# Patient Record
Sex: FEMALE | URBAN - METROPOLITAN AREA
[De-identification: names, ages, dates, MRNs, and addresses within clinical notes are randomized per-mention and may not be internally consistent; named-entity substitution may affect disease eponyms.]

---

## 2021-01-13 ENCOUNTER — APPOINTMENT (RX ONLY)
Dept: URBAN - METROPOLITAN AREA CLINIC 33 | Facility: CLINIC | Age: 31
Setting detail: DERMATOLOGY
End: 2021-01-13

## 2021-01-13 DIAGNOSIS — D22 MELANOCYTIC NEVI: ICD-10-CM

## 2021-01-13 DIAGNOSIS — L70.0 ACNE VULGARIS: ICD-10-CM

## 2021-01-13 PROBLEM — D22.9 MELANOCYTIC NEVI, UNSPECIFIED: Status: ACTIVE | Noted: 2021-01-13

## 2021-01-13 PROCEDURE — ? TREATMENT REGIMEN

## 2021-01-13 PROCEDURE — 99203 OFFICE O/P NEW LOW 30 MIN: CPT

## 2021-01-13 ASSESSMENT — LOCATION ZONE DERM: LOCATION ZONE: FACE

## 2021-01-13 ASSESSMENT — LOCATION SIMPLE DESCRIPTION DERM
LOCATION SIMPLE: LEFT FOREHEAD
LOCATION SIMPLE: LEFT CHEEK
LOCATION SIMPLE: RIGHT CHEEK

## 2021-01-13 ASSESSMENT — LOCATION DETAILED DESCRIPTION DERM
LOCATION DETAILED: LEFT INFERIOR CENTRAL MALAR CHEEK
LOCATION DETAILED: RIGHT INFERIOR CENTRAL MALAR CHEEK
LOCATION DETAILED: LEFT SUPERIOR MEDIAL FOREHEAD

## 2021-01-13 NOTE — HPI: PIMPLES (ACNE)
How Severe Is Your Acne?: mild
Is This A New Presentation, Or A Follow-Up?: Acne
Additional Comments (Use Complete Sentences): Patient has been breaking out since the start of COVID and she has been using charcoal cleanser.

## 2021-01-13 NOTE — HPI: DISCOLORATION (HYPERPIGMENTATION)
Is This A New Presentation, Or A Follow-Up?: Discoloration
How Severe Is It?: mild
Additional History: Patient notes some dark spots forming and she would like these looked at.

## 2021-01-13 NOTE — HPI: BODY LOCATION - FACE
How Severe Is Your Condition?: mild
Additional History: Patient has been getting wrinkles and wants to know if she can do anything about it.

## 2021-01-13 NOTE — PROCEDURE: MIPS QUALITY
Detail Level: Simple
Quality 431: Preventive Care And Screening: Unhealthy Alcohol Use - Screening: Unhealthy alcohol use screening not performed, reason not otherwise specified
Quality 110: Preventive Care And Screening: Influenza Immunization: Influenza immunization was not ordered or administered, reason not given
Quality 226: Preventive Care And Screening: Tobacco Use: Screening And Cessation Intervention: Patient screened for tobacco use and is an ex/non-smoker
Quality 130: Documentation Of Current Medications In The Medical Record: Current Medications Documented

## 2021-01-13 NOTE — PROCEDURE: TREATMENT REGIMEN
Detail Level: Zone
Plan: Mild acne \\nContinue with charcoal beauty 360\\nStart Neutrogena Rapid clear at bed time on T Zone \\nAlso uses Nakia , picture of her products on chart \\n
Plan: Discussed about having annual check ups , today all look normal

## 2022-04-14 ENCOUNTER — APPOINTMENT (RX ONLY)
Dept: URBAN - METROPOLITAN AREA CLINIC 1 | Facility: CLINIC | Age: 32
Setting detail: DERMATOLOGY
End: 2022-04-14

## 2022-04-14 DIAGNOSIS — L81.4 OTHER MELANIN HYPERPIGMENTATION: ICD-10-CM

## 2022-04-14 DIAGNOSIS — L90.8 OTHER ATROPHIC DISORDERS OF SKIN: ICD-10-CM

## 2022-04-14 PROCEDURE — ? COUNSELING

## 2022-04-14 PROCEDURE — ? SUNSCREEN RECOMMENDATIONS

## 2022-04-14 PROCEDURE — 99212 OFFICE O/P EST SF 10 MIN: CPT

## 2022-04-14 PROCEDURE — ? SKIN CARE REGIMEN

## 2022-04-14 PROCEDURE — ? PRESCRIPTION MEDICATION MANAGEMENT

## 2022-04-14 ASSESSMENT — LOCATION DETAILED DESCRIPTION DERM: LOCATION DETAILED: MID TRAPEZIAL NECK

## 2022-04-14 ASSESSMENT — LOCATION SIMPLE DESCRIPTION DERM: LOCATION SIMPLE: TRAPEZIAL NECK

## 2022-04-14 ASSESSMENT — LOCATION ZONE DERM: LOCATION ZONE: NECK

## 2022-04-14 NOTE — PROCEDURE: PRESCRIPTION MEDICATION MANAGEMENT
Detail Level: Zone
Plan: Advised regular sunscreen use.\\nPt will consider retinal to fade over time.
Render In Strict Bullet Format?: No

## 2022-04-14 NOTE — PROCEDURE: SUNSCREEN RECOMMENDATIONS
General Sunscreen Counseling: Broad spectrum sunscreen protects the skin from UVA and UVB rays. UVA rays are responsible for DNA damage and accelerate skin aging (fine lines, wrinkles, age spots). UVB rays are responsible for sunburns and DNA damage. Overexposure to both can lead to skin cancer. Even darker skin tones can develop skin cancer.\\n\\Herrera addition to protecting your skin against skin cancer, sunscreen protects your skin from light that induces hyperpigmentation. When trying to fade hyperpigmentation from acne, melasma, or general skin aging, sunscreen is an integral part of your skincare routine.\\n\\nChemical sunscreens absorb into the skin and convert UV Rays. They typically are lightweight and do not leave a white cast. They contain ingredients such as avobenzone, octinoxate, oxybenzone, etc. Physical sunscreens create a barrier that reflects UV rays. They are good for sensitive skin, and best for melasma and hyperpigmentation. Because they tend to leave a whitish cast, darker skin tones may prefer a tinted sunscreen that blends into the skin well.\\n\\nA broad spectrum SPF 30+ should be applied daily, even if it's a cloudy or rainy day, even if you’re indoors most of the day, and even if you only go from your house to your car to work or school. If you plan on being outdoors for extended periods of time, plan to reapply your sunscreen to sun exposed areas every two hours.
Products Recommended: La Roche Posay Anthelios Ultra Light Fluid SPF 60 ~$30 \\nCeraVe AM Facial Moisturizing Lotion SPF 30 ~$15\\nCetaphil Oil Absorbing Moisturizer SPF 30 ~$20\\n\\nEltaMD UV Clear SPF 46 ~$36\\nShiseido Ultimate Sun Protector SPF 50 ~$50\\nLa Roche Posay Anthelios AOX Serum SPF 50 ~$43\\n\\nISDIN Eryfotona Ageless SPF 50 ~$66\\nTizo3 Age Defying Fusion SPF 40 ~$40\\nEltaMD UV Elements SPF 44 ~$36\\nREssentials Tinted Mineral Sunscreen SPF 50+ ~$55
Detail Level: Detailed

## 2022-04-14 NOTE — PROCEDURE: SKIN CARE REGIMEN
Recommendation Preamble: We dicussed the basic components of a skincare routine. The following skin care regimen instructions were provided to the patient:
Recommendations (Free Text): Cleanser: tailor your cleanser to your skin type (oily, dry, normal, combination, acne prone, etc.). your cleanser may change based on your skin's needs.\\n\\nAntioxidant: antioxidant serums are an easy way to protect your skin from environmental damage, as well as fight aging and brighten skin. look for serums with ingredients like niacinamide, vitamin c, or resveratrol\\n\\nMoisturizer: your moisturizer should also be tailored to your skin type. a good moisturizer keeps your skin hydrated throughout the day, without feeling greasy. oily and acne prone skin types should look for light weight gel or serum moisturizers, while drier types should look for heavier creams or lotions\\n\\nExfoliant: exfoliation removes dead skin cells and improves the tone and texture of skin. exfoliants can be chemical (retinoids, acids, enzymes) or physical (scrubs, microexfoliants).\\n\\nSunscreen: sunscreen is important, regardless of the color of your skin. sunscreen protects against premature aging, promotes an even skin tone, and helps to heal hyperpigmentation. In addition to its cosmetic benefits, it protects against skin cancer.
Detail Level: Zone

## 2024-08-27 ENCOUNTER — APPOINTMENT (RX ONLY)
Dept: URBAN - METROPOLITAN AREA CLINIC 1 | Facility: CLINIC | Age: 34
Setting detail: DERMATOLOGY
End: 2024-08-27

## 2024-08-27 DIAGNOSIS — D22 MELANOCYTIC NEVI: ICD-10-CM

## 2024-08-27 DIAGNOSIS — L82.1 OTHER SEBORRHEIC KERATOSIS: ICD-10-CM

## 2024-08-27 PROBLEM — D22.39 MELANOCYTIC NEVI OF OTHER PARTS OF FACE: Status: ACTIVE | Noted: 2024-08-27

## 2024-08-27 PROCEDURE — ? COUNSELING

## 2024-08-27 PROCEDURE — 99212 OFFICE O/P EST SF 10 MIN: CPT

## 2024-08-27 PROCEDURE — ? OBSERVATION

## 2024-08-27 ASSESSMENT — LOCATION ZONE DERM
LOCATION ZONE: FACE
LOCATION ZONE: LEG

## 2024-08-27 ASSESSMENT — LOCATION SIMPLE DESCRIPTION DERM
LOCATION SIMPLE: RIGHT CHEEK
LOCATION SIMPLE: LEFT THIGH
LOCATION SIMPLE: LEFT FOREHEAD
LOCATION SIMPLE: RIGHT TEMPLE

## 2024-08-27 ASSESSMENT — LOCATION DETAILED DESCRIPTION DERM
LOCATION DETAILED: RIGHT MID TEMPLE
LOCATION DETAILED: LEFT ANTERIOR PROXIMAL THIGH
LOCATION DETAILED: RIGHT CENTRAL MALAR CHEEK
LOCATION DETAILED: LEFT LATERAL FOREHEAD

## 2024-08-27 NOTE — HPI: MOLE CHECK
What Is The Reason For Today's Visit?: Mole Check
Additional History: Pt wants to know if she should be concerned about her moles and if she should have them removed.

## 2024-08-27 NOTE — PROCEDURE: OBSERVATION
Detail Level: Simple
Size Of Lesion In Cm (Optional): 0.8
X Size Of Lesion In Cm (Optional): 0
Size Of Lesion In Cm (Optional): 0.3